# Patient Record
Sex: FEMALE | Race: BLACK OR AFRICAN AMERICAN
[De-identification: names, ages, dates, MRNs, and addresses within clinical notes are randomized per-mention and may not be internally consistent; named-entity substitution may affect disease eponyms.]

---

## 2019-10-22 ENCOUNTER — HOSPITAL ENCOUNTER (EMERGENCY)
Dept: HOSPITAL 1 - ED | Age: 54
LOS: 1 days | Discharge: LEFT BEFORE DISCHARGE | End: 2019-10-23
Payer: COMMERCIAL

## 2019-10-22 VITALS — SYSTOLIC BLOOD PRESSURE: 174 MMHG | DIASTOLIC BLOOD PRESSURE: 74 MMHG

## 2019-10-22 VITALS — WEIGHT: 135 LBS | HEIGHT: 72 IN | BODY MASS INDEX: 18.28 KG/M2

## 2019-10-22 DIAGNOSIS — Z53.21: Primary | ICD-10-CM

## 2021-02-04 ENCOUNTER — HOSPITAL ENCOUNTER (EMERGENCY)
Dept: HOSPITAL 26 - MED | Age: 56
LOS: 1 days | Discharge: HOME | End: 2021-02-05
Payer: COMMERCIAL

## 2021-02-04 VITALS — WEIGHT: 146 LBS | BODY MASS INDEX: 19.77 KG/M2 | HEIGHT: 72 IN

## 2021-02-04 VITALS — SYSTOLIC BLOOD PRESSURE: 166 MMHG | DIASTOLIC BLOOD PRESSURE: 92 MMHG

## 2021-02-04 DIAGNOSIS — R73.9: ICD-10-CM

## 2021-02-04 DIAGNOSIS — I10: ICD-10-CM

## 2021-02-04 DIAGNOSIS — R11.2: Primary | ICD-10-CM

## 2021-02-04 DIAGNOSIS — Z98.84: ICD-10-CM

## 2021-02-04 LAB
BASOPHILS # BLD AUTO: 0 K/UL (ref 0–0.22)
BASOPHILS NFR BLD AUTO: 0.1 % (ref 0–2)
EOSINOPHIL # BLD AUTO: 0 K/UL (ref 0–0.4)
EOSINOPHIL NFR BLD AUTO: 0 % (ref 0–4)
ERYTHROCYTE [DISTWIDTH] IN BLOOD BY AUTOMATED COUNT: 13.1 % (ref 11.6–13.7)
HCT VFR BLD AUTO: 43.6 % (ref 36–48)
HGB BLD-MCNC: 14.5 G/DL (ref 12–16)
LYMPHOCYTES # BLD AUTO: 0.5 K/UL (ref 2.5–16.5)
LYMPHOCYTES NFR BLD AUTO: 9.2 % (ref 20.5–51.1)
MCH RBC QN AUTO: 28 PG (ref 27–31)
MCHC RBC AUTO-ENTMCNC: 33 G/DL (ref 33–37)
MCV RBC AUTO: 85.3 FL (ref 80–94)
MONOCYTES # BLD AUTO: 0.2 K/UL (ref 0.8–1)
MONOCYTES NFR BLD AUTO: 3.4 % (ref 1.7–9.3)
NEUTROPHILS # BLD AUTO: 4.9 K/UL (ref 1.8–7.7)
NEUTROPHILS NFR BLD AUTO: 87.3 % (ref 42.2–75.2)
PLATELET # BLD AUTO: 184 K/UL (ref 140–450)
RBC # BLD AUTO: 5.12 MIL/UL (ref 4.2–5.4)
WBC # BLD AUTO: 5.7 K/UL (ref 4.8–10.8)

## 2021-02-04 PROCEDURE — 83690 ASSAY OF LIPASE: CPT

## 2021-02-04 PROCEDURE — 96374 THER/PROPH/DIAG INJ IV PUSH: CPT

## 2021-02-04 PROCEDURE — 87086 URINE CULTURE/COLONY COUNT: CPT

## 2021-02-04 PROCEDURE — 81001 URINALYSIS AUTO W/SCOPE: CPT

## 2021-02-04 PROCEDURE — 96375 TX/PRO/DX INJ NEW DRUG ADDON: CPT

## 2021-02-04 PROCEDURE — 36415 COLL VENOUS BLD VENIPUNCTURE: CPT

## 2021-02-04 PROCEDURE — 96361 HYDRATE IV INFUSION ADD-ON: CPT

## 2021-02-04 PROCEDURE — 74176 CT ABD & PELVIS W/O CONTRAST: CPT

## 2021-02-04 PROCEDURE — 99285 EMERGENCY DEPT VISIT HI MDM: CPT

## 2021-02-04 PROCEDURE — 85025 COMPLETE CBC W/AUTO DIFF WBC: CPT

## 2021-02-04 PROCEDURE — 80053 COMPREHEN METABOLIC PANEL: CPT

## 2021-02-04 RX ADMIN — SODIUM CHLORIDE ONE MG: 9 INJECTION, SOLUTION INTRAVENOUS at 23:46

## 2021-02-04 RX ADMIN — SODIUM CHLORIDE ONE MG: 9 INJECTION, SOLUTION INTRAVENOUS at 23:47

## 2021-02-04 RX ADMIN — SODIUM CHLORIDE, SODIUM LACTATE, POTASSIUM CHLORIDE, AND CALCIUM CHLORIDE ONE MLS/HR: .6; .31; .03; .02 INJECTION, SOLUTION INTRAVENOUS at 23:48

## 2021-02-04 NOTE — NUR
PT COMING IN WITH C/O N/V SINCE 3 PM TODAY.  PT UNABLE TO HOLD DOWN ANY FLUIDS 
OR FOOD.  PT STATES SHE IS DEHYDRATED AND HER B/P IS ELEVATED.  ABD SOFT AND 
NONTENDER UPON PALPATION.  DENIES ANY ABD PAIN.  MOIST MUCUS MEMBRANE, GOOD CAP 
REFILL.  DENIES FEVER, NO SOB, NO DIARRHEA.  PT PLACED IN BED, BED IN LOWEST 
POSITION AND SIDERAIL UP X 1.



NKA

HX - HTN, GASTRIC BYPASS (2013), MITROVALVE PROLAPSE

## 2021-02-05 VITALS — DIASTOLIC BLOOD PRESSURE: 85 MMHG | SYSTOLIC BLOOD PRESSURE: 127 MMHG

## 2021-02-05 LAB
ALBUMIN FLD-MCNC: 4.6 G/DL (ref 3.4–5)
ANION GAP SERPL CALCULATED.3IONS-SCNC: 13.3 MMOL/L (ref 8–16)
APPEARANCE UR: CLEAR
AST SERPL-CCNC: 25 U/L (ref 15–37)
BILIRUB SERPL-MCNC: 0.5 MG/DL (ref 0–1)
BILIRUB UR QL STRIP: NEGATIVE
BUN SERPL-MCNC: 13 MG/DL (ref 7–18)
CHLORIDE SERPL-SCNC: 100 MMOL/L (ref 98–107)
CO2 SERPL-SCNC: 26.2 MMOL/L (ref 21–32)
COLOR UR: YELLOW
CREAT SERPL-MCNC: 1.1 MG/DL (ref 0.6–1.3)
GFR SERPL CREATININE-BSD FRML MDRD: 67 ML/MIN (ref 90–?)
GLUCOSE SERPL-MCNC: 175 MG/DL (ref 74–106)
GLUCOSE UR STRIP-MCNC: NEGATIVE MG/DL
HGB UR QL STRIP: (no result)
LEUKOCYTE ESTERASE UR QL STRIP: NEGATIVE
LIPASE SERPL-CCNC: 80 U/L (ref 73–393)
NITRITE UR QL STRIP: NEGATIVE
PH UR STRIP: 8 [PH] (ref 5–9)
POTASSIUM SERPL-SCNC: 3.5 MMOL/L (ref 3.5–5.1)
RBC #/AREA URNS HPF: (no result) /HPF (ref 0–5)
SODIUM SERPL-SCNC: 136 MMOL/L (ref 136–145)
WBC,URINE: (no result) /HPF (ref 0–5)

## 2021-02-05 RX ADMIN — METOCLOPRAMIDE ONE MG: 5 INJECTION, SOLUTION INTRAMUSCULAR; INTRAVENOUS at 00:03

## 2021-02-05 NOTE — NUR
SPOKE TO PT'S MOTHER, EDWIN, SHE PROVIDED MORE HISTORY ON PT, ADVISED MD OF 
UPDATED HISTORY











2018 - PT FOUND UNRESPONSIVE AND HAD A CARDIAC ARREST, UNABLE TO FIND 
SPECIFIC REASON

18 - PT STARTED WITH HER FIRST EPISODE OF DRY HEAVING, THIS WAS 6 DAYS 
AFTER HER  .  SHE HAS HAD MULTIPLE EPISODES AND NO SPECIFIC REASON 
FOUND.

PT HAS BEEN TO MULTIPLE HOSPITALS IN [place] AND [place] REGARDING MEDICAL ISSUES, 
WITH NO DIAGNOSIS GIVEN.

## 2021-02-05 NOTE — NUR
X-RAY AT BEDSIDE FOR CHEST X-RAY.  IV TO RIGHT FOREARM INFILTRATED, IV D/C'ED 
AND WARM COMPRESS PROVIDED TO PT FOR SITE